# Patient Record
Sex: FEMALE | Employment: PART TIME | URBAN - METROPOLITAN AREA
[De-identification: names, ages, dates, MRNs, and addresses within clinical notes are randomized per-mention and may not be internally consistent; named-entity substitution may affect disease eponyms.]

---

## 2022-05-12 ENCOUNTER — OFFICE VISIT (OUTPATIENT)
Dept: FAMILY MEDICINE | Facility: CLINIC | Age: 22
End: 2022-05-12
Payer: COMMERCIAL

## 2022-05-12 VITALS
HEIGHT: 65 IN | RESPIRATION RATE: 18 BRPM | OXYGEN SATURATION: 99 % | DIASTOLIC BLOOD PRESSURE: 70 MMHG | SYSTOLIC BLOOD PRESSURE: 118 MMHG | WEIGHT: 125 LBS | HEART RATE: 70 BPM | BODY MASS INDEX: 20.83 KG/M2

## 2022-05-12 DIAGNOSIS — K21.9 GASTROESOPHAGEAL REFLUX DISEASE, UNSPECIFIED WHETHER ESOPHAGITIS PRESENT: Primary | ICD-10-CM

## 2022-05-12 DIAGNOSIS — Z12.4 SCREENING FOR MALIGNANT NEOPLASM OF CERVIX: ICD-10-CM

## 2022-05-12 DIAGNOSIS — R42 DIZZINESS: ICD-10-CM

## 2022-05-12 DIAGNOSIS — E55.9 VITAMIN D DEFICIENCY: ICD-10-CM

## 2022-05-12 DIAGNOSIS — E53.8 B12 DEFICIENCY: ICD-10-CM

## 2022-05-12 DIAGNOSIS — R53.83 FATIGUE, UNSPECIFIED TYPE: ICD-10-CM

## 2022-05-12 LAB
B-HCG UR QL: NEGATIVE
BILIRUB SERPL-MCNC: NEGATIVE MG/DL
BLOOD, POC UA: NEGATIVE
CTP QC/QA: YES
CTP QC/QA: YES
GLUCOSE UR QL STRIP: NEGATIVE
KETONES UR QL STRIP: NEGATIVE
LEUKOCYTE ESTERASE URINE, POC: NEGATIVE
NITRITE, POC UA: NEGATIVE
PH, POC UA: 8
PROTEIN, POC: NEGATIVE
SARS-COV-2 RDRP RESP QL NAA+PROBE: NEGATIVE
SPECIFIC GRAVITY, POC UA: 1.02
UROBILINOGEN, POC UA: 0.2

## 2022-05-12 PROCEDURE — 3008F BODY MASS INDEX DOCD: CPT | Mod: CPTII,S$GLB,, | Performed by: STUDENT IN AN ORGANIZED HEALTH CARE EDUCATION/TRAINING PROGRAM

## 2022-05-12 PROCEDURE — 81025 POCT URINE PREGNANCY: ICD-10-PCS | Mod: S$GLB,,, | Performed by: STUDENT IN AN ORGANIZED HEALTH CARE EDUCATION/TRAINING PROGRAM

## 2022-05-12 PROCEDURE — U0002: ICD-10-PCS | Mod: QW,S$GLB,, | Performed by: STUDENT IN AN ORGANIZED HEALTH CARE EDUCATION/TRAINING PROGRAM

## 2022-05-12 PROCEDURE — 3008F PR BODY MASS INDEX (BMI) DOCUMENTED: ICD-10-PCS | Mod: CPTII,S$GLB,, | Performed by: STUDENT IN AN ORGANIZED HEALTH CARE EDUCATION/TRAINING PROGRAM

## 2022-05-12 PROCEDURE — 1159F MED LIST DOCD IN RCRD: CPT | Mod: CPTII,S$GLB,, | Performed by: STUDENT IN AN ORGANIZED HEALTH CARE EDUCATION/TRAINING PROGRAM

## 2022-05-12 PROCEDURE — 99204 OFFICE O/P NEW MOD 45 MIN: CPT | Mod: S$GLB,,, | Performed by: STUDENT IN AN ORGANIZED HEALTH CARE EDUCATION/TRAINING PROGRAM

## 2022-05-12 PROCEDURE — 81003 POCT URINALYSIS: ICD-10-PCS | Mod: QW,S$GLB,, | Performed by: STUDENT IN AN ORGANIZED HEALTH CARE EDUCATION/TRAINING PROGRAM

## 2022-05-12 PROCEDURE — 1160F PR REVIEW ALL MEDS BY PRESCRIBER/CLIN PHARMACIST DOCUMENTED: ICD-10-PCS | Mod: CPTII,S$GLB,, | Performed by: STUDENT IN AN ORGANIZED HEALTH CARE EDUCATION/TRAINING PROGRAM

## 2022-05-12 PROCEDURE — 99204 PR OFFICE/OUTPT VISIT, NEW, LEVL IV, 45-59 MIN: ICD-10-PCS | Mod: S$GLB,,, | Performed by: STUDENT IN AN ORGANIZED HEALTH CARE EDUCATION/TRAINING PROGRAM

## 2022-05-12 PROCEDURE — 81025 URINE PREGNANCY TEST: CPT | Mod: S$GLB,,, | Performed by: STUDENT IN AN ORGANIZED HEALTH CARE EDUCATION/TRAINING PROGRAM

## 2022-05-12 PROCEDURE — 81003 URINALYSIS AUTO W/O SCOPE: CPT | Mod: QW,S$GLB,, | Performed by: STUDENT IN AN ORGANIZED HEALTH CARE EDUCATION/TRAINING PROGRAM

## 2022-05-12 PROCEDURE — 3074F SYST BP LT 130 MM HG: CPT | Mod: CPTII,S$GLB,, | Performed by: STUDENT IN AN ORGANIZED HEALTH CARE EDUCATION/TRAINING PROGRAM

## 2022-05-12 PROCEDURE — 3078F PR MOST RECENT DIASTOLIC BLOOD PRESSURE < 80 MM HG: ICD-10-PCS | Mod: CPTII,S$GLB,, | Performed by: STUDENT IN AN ORGANIZED HEALTH CARE EDUCATION/TRAINING PROGRAM

## 2022-05-12 PROCEDURE — 1159F PR MEDICATION LIST DOCUMENTED IN MEDICAL RECORD: ICD-10-PCS | Mod: CPTII,S$GLB,, | Performed by: STUDENT IN AN ORGANIZED HEALTH CARE EDUCATION/TRAINING PROGRAM

## 2022-05-12 PROCEDURE — 1160F RVW MEDS BY RX/DR IN RCRD: CPT | Mod: CPTII,S$GLB,, | Performed by: STUDENT IN AN ORGANIZED HEALTH CARE EDUCATION/TRAINING PROGRAM

## 2022-05-12 PROCEDURE — 3074F PR MOST RECENT SYSTOLIC BLOOD PRESSURE < 130 MM HG: ICD-10-PCS | Mod: CPTII,S$GLB,, | Performed by: STUDENT IN AN ORGANIZED HEALTH CARE EDUCATION/TRAINING PROGRAM

## 2022-05-12 PROCEDURE — 3078F DIAST BP <80 MM HG: CPT | Mod: CPTII,S$GLB,, | Performed by: STUDENT IN AN ORGANIZED HEALTH CARE EDUCATION/TRAINING PROGRAM

## 2022-05-12 PROCEDURE — U0002 COVID-19 LAB TEST NON-CDC: HCPCS | Mod: QW,S$GLB,, | Performed by: STUDENT IN AN ORGANIZED HEALTH CARE EDUCATION/TRAINING PROGRAM

## 2022-05-12 NOTE — PROGRESS NOTES
Subjective:      Patient ID: Kwame Guerra is a 22 y.o. female.    Chief Complaint: Fatigue, Dizziness, Anorexia, Shaking, Muscle Pain, and Insomnia      HPI:  22-year-old female with history of acute ITP presents today for fatigue dizziness, muscle pain, and insomnia.  Patient states symptoms began approximately 1 week ago.  Denies any cough or congestion.  Denies fever.  Patient reports being very drowsy throughout the day.  States her appetite is decreased.  She does report her legs feeling weak after the day.  She does have trouble sleeping.  She has had difficulty with anxiety in the past.  Does not take medication for this.  Denies any recent medication changes.  Denies nausea or vomiting.  She does have acid reflux.  Most noticeable after most foods.  She has not tried any OTC meds.  Otherwise patient doing well.  She is ready for blood work today.    Past Medical History:   Diagnosis Date    Acute ITP      Past Surgical History:   Procedure Laterality Date    CLOSED REDUCTION AND PERCUTANEOUS PINNING DISTAL RADIUS FRACTURE       Family History   Problem Relation Age of Onset    Hypertension Father      Social History     Socioeconomic History    Marital status: Single   Tobacco Use    Smoking status: Never Smoker    Smokeless tobacco: Never Used   Substance and Sexual Activity    Alcohol use: Never    Drug use: Never     Review of patient's allergies indicates:  No Known Allergies    Review of Systems   Constitutional: Positive for fatigue. Negative for activity change, appetite change, fever and unexpected weight change.   HENT: Negative for congestion, postnasal drip, rhinorrhea and sinus pain.    Respiratory: Negative for cough and shortness of breath.    Cardiovascular: Negative for chest pain and palpitations.   Gastrointestinal: Negative for abdominal pain, nausea and vomiting.   Genitourinary: Negative for difficulty urinating, dysuria, frequency and urgency.   Musculoskeletal: Negative for  "arthralgias and myalgias.   Neurological: Positive for dizziness. Negative for seizures, syncope, speech difficulty, numbness and headaches.   Psychiatric/Behavioral: Negative for decreased concentration and dysphoric mood. The patient is not nervous/anxious.        Objective:       /70 (BP Location: Left arm, Patient Position: Sitting, BP Method: Medium (Manual))   Pulse 70   Resp 18   Ht 5' 5" (1.651 m)   Wt 56.7 kg (125 lb)   SpO2 99%   BMI 20.80 kg/m²   Physical Exam  Vitals and nursing note reviewed.   Constitutional:       Appearance: Normal appearance. She is well-developed.   HENT:      Head: Normocephalic and atraumatic.   Eyes:      Extraocular Movements: Extraocular movements intact.      Conjunctiva/sclera: Conjunctivae normal.      Pupils: Pupils are equal, round, and reactive to light.   Cardiovascular:      Rate and Rhythm: Normal rate and regular rhythm.      Heart sounds: Normal heart sounds.   Pulmonary:      Effort: Pulmonary effort is normal.      Breath sounds: Normal breath sounds. No wheezing, rhonchi or rales.   Abdominal:      Palpations: Abdomen is soft.      Tenderness: There is no abdominal tenderness. There is no guarding or rebound.   Musculoskeletal:         General: Normal range of motion.      Cervical back: Normal range of motion and neck supple.   Skin:     General: Skin is warm and dry.   Neurological:      General: No focal deficit present.      Mental Status: She is alert and oriented to person, place, and time.   Psychiatric:         Mood and Affect: Mood normal.         Assessment:     1. Gastroesophageal reflux disease, unspecified whether esophagitis present    2. Dizziness    3. Fatigue, unspecified type    4. Vitamin D deficiency    5. B12 deficiency    6. Screening for malignant neoplasm of cervix        Plan:   Gastroesophageal reflux disease, unspecified whether esophagitis present    Dizziness  -     POCT urine pregnancy  -     POCT URINALYSIS  -     EKG " 12-lead  -     CBC Auto Differential; Future; Expected date: 05/12/2022  -     Comprehensive Metabolic Panel; Future; Expected date: 05/12/2022  -     Vitamin D; Future; Expected date: 05/12/2022  -     Vitamin B12; Future; Expected date: 05/12/2022    Fatigue, unspecified type  -     POCT COVID-19 Rapid Screening  -     EKG 12-lead  -     CBC Auto Differential; Future; Expected date: 05/12/2022  -     Comprehensive Metabolic Panel; Future; Expected date: 05/12/2022  -     Lipid Panel; Future; Expected date: 05/12/2022  -     T4, Free; Future; Expected date: 05/12/2022  -     TSH; Future; Expected date: 05/12/2022  -     Vitamin D; Future; Expected date: 05/12/2022  -     Vitamin B12; Future; Expected date: 05/12/2022    Vitamin D deficiency  -     Vitamin D; Future; Expected date: 05/12/2022    B12 deficiency  -     Vitamin B12; Future; Expected date: 05/12/2022    Screening for malignant neoplasm of cervix      Conservative measures discussed.  Cut back on tomato based foods, onions, garlic, caffeine, and chocolate.  Can use OTC meds as knee consider Protonix if no improvement.    UA and UPT negative.    EKG obtained and reviewed with the following findings:  Sinus arrhythmia.  Normal axis.  No acute ST or T-wave changes.    COVID negative.    Labs pending.    Increase fluid intake.    Discussed Pap smear screening.  Patient plans to find gynecologist back home.    Strict precautions provided.  RTC if symptoms worsen or no improvement.               Disclaimer: This note may have been prepared using voice recognition software, it may have not been extensively proofed, as such there could be errors within the text such as sound alike errors.

## 2022-05-14 LAB
ABS NRBC COUNT: 0 X 10 3/UL (ref 0–0.01)
ABSOLUTE BASOPHIL: 0.05 X 10 3/UL (ref 0–0.22)
ABSOLUTE EOSINOPHIL: 0.12 X 10 3/UL (ref 0.04–0.54)
ABSOLUTE IMMATURE GRAN: 0.02 X 10 3/UL (ref 0–0.04)
ABSOLUTE LYMPHOCYTE: 2.03 X 10 3/UL (ref 0.86–4.75)
ABSOLUTE MONOCYTE: 0.65 X 10 3/UL (ref 0.22–1.08)
ALBUMIN SERPL-MCNC: 4.7 G/DL (ref 3.5–5.2)
ALBUMIN/GLOB SERPL ELPH: 2.1 {RATIO} (ref 1–2.7)
ALP ISOS SERPL LEV INH-CCNC: 52 U/L (ref 35–105)
ALT (SGPT): 11 U/L (ref 0–33)
ANION GAP SERPL CALC-SCNC: 11 MMOL/L (ref 8–17)
AST SERPL-CCNC: 17 U/L (ref 0–32)
B12: 367 PG/ML (ref 232–1245)
BASOPHILS NFR BLD: 0.6 % (ref 0.2–1.2)
BILIRUBIN, TOTAL: 0.29 MG/DL (ref 0–1.2)
BUN/CREAT SERPL: 15.8 (ref 6–20)
CALCIUM SERPL-MCNC: 9.3 MG/DL (ref 8.6–10.2)
CARBON DIOXIDE, CO2: 22 MMOL/L (ref 22–29)
CHLORIDE: 105 MMOL/L (ref 98–107)
CHOLEST SERPL-MSCNC: 112 MG/DL (ref 100–200)
CREAT SERPL-MCNC: 0.55 MG/DL (ref 0.5–0.9)
EOSINOPHIL NFR BLD: 1.4 % (ref 0.7–7)
GFR ESTIMATION: 139.53
GLOBULIN: 2.2 G/DL (ref 1.5–4.5)
GLUCOSE: 81 MG/DL (ref 74–106)
HCT VFR BLD AUTO: 37.1 % (ref 37–47)
HDLC SERPL-MCNC: 77 MG/DL
HGB BLD-MCNC: 12.4 G/DL (ref 12–16)
IMMATURE GRANULOCYTES: 0.2 % (ref 0–0.5)
LDL/HDL RATIO: NORMAL
LDLC SERPL CALC-MCNC: NORMAL MG/DL
LYMPHOCYTES NFR BLD: 23.5 % (ref 19.3–53.1)
MCH RBC QN AUTO: 30.8 PG (ref 27–32)
MCHC RBC AUTO-ENTMCNC: 33.4 G/DL (ref 32–36)
MCV RBC AUTO: 92.3 FL (ref 82–100)
MONOCYTES NFR BLD: 7.5 % (ref 4.7–12.5)
NEUTROPHILS # BLD AUTO: 5.76 X 10 3/UL (ref 2.15–7.56)
NEUTROPHILS NFR BLD: 66.8 % (ref 34–71.1)
NUCLEATED RED BLOOD CELLS: 0 /100 WBC (ref 0–0.2)
PLATELET # BLD AUTO: 165 X 10 3/UL (ref 135–400)
POTASSIUM: 4 MMOL/L (ref 3.5–5.1)
PROT SNV-MCNC: 6.9 G/DL (ref 6.4–8.3)
RBC # BLD AUTO: 4.02 X 10 6/UL (ref 4.2–5.4)
RDW-SD: 43.7 FL (ref 37–54)
SODIUM: 138 MMOL/L (ref 136–145)
T4, FREE: 1.22 NG/DL (ref 0.93–1.7)
TRIGL SERPL-MCNC: 21 MG/DL (ref 0–150)
TSH SERPL DL<=0.005 MIU/L-ACNC: 1.08 UIU/ML (ref 0.27–4.2)
UREA NITROGEN (BUN): 8.7 MG/DL (ref 6–20)
VITAMIN D (25OHD): 10.1 NG/ML
WBC # BLD: 8.63 X 10 3/UL (ref 4.3–10.8)

## 2022-05-19 RX ORDER — ERGOCALCIFEROL 1.25 MG/1
50000 CAPSULE ORAL
Qty: 4 CAPSULE | Refills: 2 | Status: SHIPPED | OUTPATIENT
Start: 2022-05-19 | End: 2022-06-18

## 2024-08-13 ENCOUNTER — OFFICE VISIT (OUTPATIENT)
Dept: FAMILY MEDICINE | Facility: CLINIC | Age: 24
End: 2024-08-13
Payer: COMMERCIAL

## 2024-08-13 VITALS
SYSTOLIC BLOOD PRESSURE: 113 MMHG | BODY MASS INDEX: 21.16 KG/M2 | HEIGHT: 65 IN | HEART RATE: 82 BPM | OXYGEN SATURATION: 100 % | WEIGHT: 127 LBS | DIASTOLIC BLOOD PRESSURE: 71 MMHG

## 2024-08-13 DIAGNOSIS — E55.9 VITAMIN D DEFICIENCY: ICD-10-CM

## 2024-08-13 DIAGNOSIS — D50.9 IRON DEFICIENCY ANEMIA, UNSPECIFIED IRON DEFICIENCY ANEMIA TYPE: Primary | ICD-10-CM

## 2024-08-13 DIAGNOSIS — Z00.00 PREVENTATIVE HEALTH CARE: Primary | ICD-10-CM

## 2024-08-13 DIAGNOSIS — J30.9 ALLERGIC RHINITIS, UNSPECIFIED SEASONALITY, UNSPECIFIED TRIGGER: ICD-10-CM

## 2024-08-13 DIAGNOSIS — R00.0 TACHYCARDIA: ICD-10-CM

## 2024-08-13 LAB
%TRANSFERRIN SATURATION: 11.1 % (ref 20–50)
ABS NRBC COUNT: 0 X 10 3/UL (ref 0–0.01)
ABSOLUTE BASOPHIL: 0.04 X 10 3/UL (ref 0–0.22)
ABSOLUTE EOSINOPHIL: 0.32 X 10 3/UL (ref 0.04–0.54)
ABSOLUTE IMMATURE GRAN: 0.01 X 10 3/UL (ref 0–0.04)
ABSOLUTE LYMPHOCYTE: 2.15 X 10 3/UL (ref 0.86–4.75)
ABSOLUTE MONOCYTE: 0.59 X 10 3/UL (ref 0.22–1.08)
ALBUMIN SERPL-MCNC: 4.4 G/DL (ref 3.5–5.2)
ALBUMIN/GLOB SERPL ELPH: 1.7 {RATIO} (ref 1–2.7)
ALP ISOS SERPL LEV INH-CCNC: 43 U/L (ref 35–105)
ALT (SGPT): 9 U/L (ref 0–33)
AMORPH URATE CRY URNS QL MICRO: NEGATIVE
ANION GAP SERPL CALC-SCNC: 11 MMOL/L (ref 8–17)
AST SERPL-CCNC: 15 U/L (ref 0–32)
BACTERIA #/AREA URNS HPF: ABNORMAL /[HPF]
BASOPHILS NFR BLD: 0.6 % (ref 0.2–1.2)
BILIRUB UR QL STRIP: NEGATIVE
BILIRUBIN, TOTAL: 0.18 MG/DL (ref 0–1.2)
BUN/CREAT SERPL: 13.4 (ref 6–20)
CALCIUM SERPL-MCNC: 9.3 MG/DL (ref 8.6–10.2)
CARBON DIOXIDE, CO2: 23 MMOL/L (ref 22–29)
CHLORIDE: 108 MMOL/L (ref 98–107)
CHOLEST SERPL-MSCNC: 115 MG/DL (ref 100–200)
CLARITY UR: ABNORMAL
COLOR UR: YELLOW
CREAT SERPL-MCNC: 0.65 MG/DL (ref 0.5–0.9)
EOSINOPHIL NFR BLD: 5 % (ref 0.7–7)
EPITHELIAL CELLS: ABNORMAL
ESTIMATED AVERAGE GLUCOSE: 98 MG/DL
FERRITIN: 55.6 NG/ML (ref 10–154)
GFR ESTIMATION: 126.8 ML/MIN/1.73M2
GLOBULIN: 2.6 G/DL (ref 1.5–4.5)
GLUCOSE (UA): NEGATIVE MG/DL
GLUCOSE: 84 MG/DL (ref 74–106)
HBA1C MFR BLD: 5.1 % (ref 4–6)
HCT VFR BLD AUTO: 34.3 % (ref 37–47)
HDLC SERPL-MCNC: 71 MG/DL
HGB BLD-MCNC: 11.3 G/DL (ref 12–16)
IMMATURE GRANULOCYTES: 0.2 % (ref 0–0.5)
IRON BINDING CAPACITY: 279 UG/DL (ref 262–472)
IRON SERPL-MCNC: 31 UG/DL (ref 37–145)
KETONES UR QL STRIP: NEGATIVE MG/DL
LDL/HDL RATIO: NORMAL
LDLC SERPL CALC-MCNC: NORMAL MG/DL
LEUKOCYTE ESTERASE UR QL STRIP: NEGATIVE
LYMPHOCYTES NFR BLD: 33.8 % (ref 19.3–53.1)
MAGNESIUM SERPL-MCNC: 2 MG/DL (ref 1.6–2.4)
MCH RBC QN AUTO: 30.1 PG (ref 27–32)
MCHC RBC AUTO-ENTMCNC: 32.9 G/DL (ref 32–36)
MCV RBC AUTO: 91.5 FL (ref 82–100)
MONOCYTES NFR BLD: 9.3 % (ref 4.7–12.5)
MUCOUS THREADS URNS QL MICRO: ABNORMAL
NEUTROPHILS # BLD AUTO: 3.26 X 10 3/UL (ref 2.15–7.56)
NEUTROPHILS NFR BLD: 51.1 % (ref 34–71.1)
NITRITE UR QL STRIP: NEGATIVE
NUCLEATED RED BLOOD CELLS: 0 /100 WBC (ref 0–0.2)
OCCULT BLOOD: ABNORMAL
PH, URINE: 6 (ref 5–7.5)
PLATELET # BLD AUTO: 147 X 10 3/UL (ref 135–400)
POTASSIUM: 3.9 MMOL/L (ref 3.5–5.1)
PROT SNV-MCNC: 7 G/DL (ref 6.4–8.3)
PROT UR QL STRIP: NEGATIVE MG/DL
RBC # BLD AUTO: 3.75 X 10 6/UL (ref 4.2–5.4)
RBC/HPF: NEGATIVE
RDW-SD: 42.7 FL (ref 37–54)
SODIUM: 142 MMOL/L (ref 136–145)
SP GR UR STRIP: 1.01 (ref 1–1.03)
T4, FREE: 1.18 NG/DL (ref 0.93–1.7)
TRIGL SERPL-MCNC: 23 MG/DL (ref 0–150)
TSH SERPL DL<=0.005 MIU/L-ACNC: 3.42 UIU/ML (ref 0.27–4.2)
UIBC SERPL-MCNC: 248 UG/DL (ref 112–306)
UREA NITROGEN (BUN): 8.7 MG/DL (ref 6–20)
UROBILINOGEN, URINE: NORMAL E.U./DL (ref 0–1)
VITAMIN D (25OHD): 31.2 NG/ML
WBC # BLD: 6.37 X 10 3/UL (ref 4.3–10.8)
WBC/HPF: ABNORMAL

## 2024-08-13 PROCEDURE — 3008F BODY MASS INDEX DOCD: CPT | Mod: CPTII,S$GLB,, | Performed by: NURSE PRACTITIONER

## 2024-08-13 PROCEDURE — 99385 PREV VISIT NEW AGE 18-39: CPT | Mod: S$GLB,,, | Performed by: NURSE PRACTITIONER

## 2024-08-13 PROCEDURE — 3074F SYST BP LT 130 MM HG: CPT | Mod: CPTII,S$GLB,, | Performed by: NURSE PRACTITIONER

## 2024-08-13 PROCEDURE — 3078F DIAST BP <80 MM HG: CPT | Mod: CPTII,S$GLB,, | Performed by: NURSE PRACTITIONER

## 2024-08-13 PROCEDURE — 1159F MED LIST DOCD IN RCRD: CPT | Mod: CPTII,S$GLB,, | Performed by: NURSE PRACTITIONER

## 2024-08-13 RX ORDER — FERROUS GLUCONATE 324(38)MG
324 TABLET ORAL
Qty: 90 TABLET | Refills: 0 | Status: SHIPPED | OUTPATIENT
Start: 2024-08-13

## 2024-08-13 RX ORDER — MONTELUKAST SODIUM 10 MG/1
10 TABLET ORAL NIGHTLY
Qty: 30 TABLET | Refills: 3 | Status: SHIPPED | OUTPATIENT
Start: 2024-08-13 | End: 2024-09-12

## 2024-08-13 NOTE — PROGRESS NOTES
Subjective:      Patient ID: Kwame Guerra is a 24 y.o. female.    Chief Complaint: Establish Care (Did polograph test for job at [employer] office ; showed tachycardia concerns) and allergy screening (Congestion x1mn/runny nose)      24-year-old female     with history of acute ITP age 8... resolved.       Here today for annual wellness visit and to establish primary care.         Patient reports an episode of tachycardia while getting a poly graft done.  The technician told her that she had spikes of tachycardia.  She denies chest pain, palpitations, shortness for breath, KEITH.  She would like to get an EKG done.       2nd issue is seasonal allergies.  Uncontrolled despite use of Zyrtec daily.  She is requesting allergy testing.      No other issues reported      Past Medical History:   Diagnosis Date    Acute ITP       Social History     Socioeconomic History    Marital status: Single   Tobacco Use    Smoking status: Never    Smokeless tobacco: Never   Substance and Sexual Activity    Alcohol use: Never    Drug use: Never     Social Determinants of Health     Financial Resource Strain: Medium Risk (8/10/2024)    Overall Financial Resource Strain (CARDIA)     Difficulty of Paying Living Expenses: Somewhat hard   Food Insecurity: No Food Insecurity (8/10/2024)    Hunger Vital Sign     Worried About Running Out of Food in the Last Year: Never true     Ran Out of Food in the Last Year: Never true   Physical Activity: Sufficiently Active (8/10/2024)    Exercise Vital Sign     Days of Exercise per Week: 4 days     Minutes of Exercise per Session: 40 min   Stress: No Stress Concern Present (8/10/2024)    [employer] - Occupational Stress Questionnaire     Feeling of Stress : Not at all   Housing Stability: Unknown (8/10/2024)    Housing Stability Vital Sign     Unable to Pay for Housing in the Last Year: No      Family History   Problem Relation Name Age of Onset    Hypertension Father           ROS:   Review of Systems   Constitutional:  Negative for chills and fever.   HENT:  Positive for rhinorrhea. Negative for sneezing, sore throat and trouble swallowing.    Respiratory:  Negative for apnea, cough, choking, shortness of breath and wheezing.    Cardiovascular:  Negative for chest pain, palpitations and leg swelling.   Gastrointestinal:  Positive for diarrhea (occasional). Negative for abdominal pain, nausea and vomiting.   Genitourinary:  Negative for difficulty urinating, dysuria, flank pain, frequency, hematuria and urgency.   Musculoskeletal:  Negative for joint swelling and neck stiffness.   Skin:  Negative for rash.   Neurological:  Negative for dizziness and weakness.   Psychiatric/Behavioral:  Negative for confusion, hallucinations, self-injury and suicidal ideas.    All other systems reviewed and are negative.    Objective:   Physical Exam  Vitals and nursing note reviewed.   Constitutional:       General: She is not in acute distress.     Appearance: Normal appearance. She is well-developed. She is not ill-appearing.   HENT:      Head: Normocephalic and atraumatic.      Right Ear: Tympanic membrane, ear canal and external ear normal.      Left Ear: Tympanic membrane, ear canal and external ear normal.      Nose: Nose normal.      Mouth/Throat:      Mouth: Mucous membranes are moist.      Pharynx: Oropharynx is clear. No oropharyngeal exudate or posterior oropharyngeal erythema.   Eyes:      Extraocular Movements: Extraocular movements intact.      Conjunctiva/sclera: Conjunctivae normal.      Pupils: Pupils are equal, round, and reactive to light.   Neck:      Thyroid: No thyroid mass, thyromegaly or thyroid tenderness.      Vascular: No carotid bruit.   Cardiovascular:      Rate and Rhythm: Normal rate and regular rhythm.      Pulses: Normal pulses.      Heart sounds: Normal heart sounds. No murmur heard.     No friction rub. No gallop.   Pulmonary:      Effort: Pulmonary effort is normal.       Breath sounds: Normal breath sounds. No wheezing, rhonchi or rales.   Abdominal:      Palpations: Abdomen is soft.      Tenderness: There is no abdominal tenderness. There is no guarding or rebound.   Musculoskeletal:         General: Normal range of motion.      Cervical back: Normal range of motion and neck supple.   Skin:     General: Skin is warm and dry.   Neurological:      Mental Status: She is alert and oriented to person, place, and time. Mental status is at baseline.   Psychiatric:         Mood and Affect: Mood normal.         Behavior: Behavior normal.         Thought Content: Thought content normal.       Assessment:     1. Preventative health care    2. Vitamin D deficiency    3. Tachycardia    4. Allergic rhinitis, unspecified seasonality, unspecified trigger      No images are attached to the encounter.   Plan:     Problem List Items Addressed This Visit    None  Visit Diagnoses       Preventative health care    -  Primary    Relevant Orders    Magnesium    Iron, TIBC and Ferritin Panel    Vitamin D    Urinalysis, Reflex to Urine Culture Urine, Clean Catch    TSH+Free T4    Lipid Panel    Hemoglobin A1C    Comprehensive Metabolic Panel    CBC Auto Differential    Vitamin D deficiency        Relevant Orders    Magnesium    Iron, TIBC and Ferritin Panel    Vitamin D    Urinalysis, Reflex to Urine Culture Urine, Clean Catch    TSH+Free T4    Lipid Panel    Hemoglobin A1C    Comprehensive Metabolic Panel    CBC Auto Differential    Tachycardia        Advised to notify me if palpitations present. would order holter at that time.    Relevant Orders    Magnesium    Iron, TIBC and Ferritin Panel    Vitamin D    Urinalysis, Reflex to Urine Culture Urine, Clean Catch    TSH+Free T4    Lipid Panel    Hemoglobin A1C    Comprehensive Metabolic Panel    CBC Auto Differential    EKG 12-lead    Allergic rhinitis, unspecified seasonality, unspecified trigger        Start singulair. Continue Zyrtec. Refer to ENT  for allergy testing.    Relevant Medications    montelukast (SINGULAIR) 10 mg tablet    Other Relevant Orders    Ambulatory referral/consult to Allergy          Procedures     No visits with results within 1 Month(s) from this visit.   Latest known visit with results is:   Office Visit on 05/12/2022   Component Date Value Ref Range Status    POC Preg Test, Ur 05/12/2022 Negative  Negative Final     Acceptable 05/12/2022 Yes   Final    WBC, UA 05/12/2022 Negative   Final    Nitrite, UA 05/12/2022 Negative   Final    Urobilinogen, UA 05/12/2022 0.2   Final    Protein, POC 05/12/2022 Negative   Final    pH, UA 05/12/2022 8.0   Final    Blood, UA 05/12/2022 Negative   Final    Spec Grav UA 05/12/2022 1.020   Final    Ketones, UA 05/12/2022 Negative   Final    Bilirubin, POC 05/12/2022 Negative   Final    Glucose, UA 05/12/2022 Negative   Final    POC Rapid COVID 05/12/2022 Negative  Negative Final     Acceptable 05/12/2022 Yes   Final    B12 05/13/2022 367  232 - 1,245 pg/mL Final    Comment: NOTE  Testing performed at:  [facility], [address] [place] [address] CLIA #:[id number]      VITAMIN D (25OHD) 05/13/2022 10.1 (L)  >30 ng/mL Final    Comment: Expected values:    Deficient                 <20 ng/mL  Insufficient              21-29 ng/mL  Sufficient                >30 ng/mL  NOTE  Testing performed at:  [facility], [address] [place] [address] CLIA #:[id number]      TSH 05/13/2022 1.08  0.27 - 4.20 uIU/mL Final    Comment: NOTE  Testing performed at:  [facility], [address] [place] [address] CLIA #:[id number]      T4, Free 05/13/2022 1.22  0.93 - 1.70 ng/dL Final    Comment: NOTE  Testing performed at:  [facility], [address] [place] [address] CLIA #:[id number]      Glucose 05/13/2022 81  74 - 106 mg/dL Final    BUN 05/13/2022 8.7  6 - 20 mg/dL Final    Creatinine 05/13/2022 0.55  0.50 -  0.90 mg/dL Final    AST 05/13/2022 17  0 - 32 U/L Final    ALT (SGPT) 05/13/2022 11  0 - 33 U/L Final    Alkaline Phosphatase 05/13/2022 52  35 - 105 U/L Final    Calcium 05/13/2022 9.3  8.6 - 10.2 mg/dL Final    Protein, Total 05/13/2022 6.9  6.4 - 8.3 g/dL Final    Albumin 05/13/2022 4.7  3.5 - 5.2 g/dL Final    BILIRUBIN, TOTAL 05/13/2022 0.29  0.00 - 1.20 mg/dL Final    Sodium 05/13/2022 138  136 - 145 mmol/L Final    Potassium 05/13/2022 4.0  3.5 - 5.1 mmol/L Final    Chloride 05/13/2022 105  98 - 107 mmol/L Final    CO2 05/13/2022 22  22 - 29 mmol/L Final    Globulin 05/13/2022 2.2  1.5 - 4.5 g/dL Final    Albumin/Globulin Ratio 05/13/2022 2.1  1.0 - 2.7 Final    BUN/Creatinine Ratio 05/13/2022 15.8  6 - 20 Final    GFR ESTIMATION 05/13/2022 139.53  >60.00 Final    Comment: GFR estimation is reported as mL/min/1.73m squared.  It is recommended  that GFR values for  be multiplied x 1.212.  The  higher the GFR, the better the kidney function.  A GFR of >60 is  considered normal (depending on age and whether the patient is a male  or female.      Anion Gap 05/13/2022 11.0  8.0 - 17.0 mmol/L Final    Comment: NOTE  Testing performed at:  [facility], [address] [place] [address]  [address] CLIA #:[id number]      WBC 05/13/2022 8.63  4.3 - 10.8 X 10 3/ul Final    RBC 05/13/2022 4.02 (L)  4.2 - 5.4 X 10 6/ul Final    RDW-SD 05/13/2022 43.7  37 - 54 fl Final    Hemoglobin 05/13/2022 12.4  12 - 16 g/dL Final    Hematocrit 05/13/2022 37.1  37 - 47 % Final    MCV 05/13/2022 92.3  82 - 100 fl Final    MCH 05/13/2022 30.8  27 - 32 pg Final    MCHC 05/13/2022 33.4  32 - 36 g/dL Final    Platelets 05/13/2022 165  135 - 400 X 10 3/ul Final    Neutrophils 05/13/2022 66.8  34 - 71.1 % Final    Lymphocytes 05/13/2022 23.5  19.3 - 53.1 % Final    Monocytes 05/13/2022 7.5  4.7 - 12.5 % Final    Eosinophils 05/13/2022 1.4  0.7 - 7.0 % Final    Basophils 05/13/2022 0.6  0.2 - 1.2 % Final    Neutrophils  Absolute 05/13/2022 5.76  2.15 - 7.56 X 10 3/ul Final    Lymphocytes Absolute 05/13/2022 2.03  0.86 - 4.75 X 10 3/ul Final    Monocytes Absolute 05/13/2022 0.65  0.22 - 1.08 X 10 3/ul Final    Eosinophils Absolute 05/13/2022 0.12  0.04 - 0.54 X 10 3/ul Final    Basophils Absolute 05/13/2022 0.05  0.00 - 0.22 X 10 3/ul Final    Immature Granulocytes Absolute 05/13/2022 0.02  0 - 0.04 X 10 3/ul Final    Immature Granulocytes 05/13/2022 0.2  0 - 0.5 % Final    IG includes metamyelocytes, myelocytes, and promyelocytes    nRBC# 05/13/2022 0.0  0 - 0.2 /100 WBC Final    nRBC Count Absolute 05/13/2022 0.000  0 - 0.012 x 10 3/ul Final    Comment: NOTE  Testing performed at:  [facility], [address] [place] [address]  [id number] CLIA #:[id number]      Cholesterol 05/13/2022 112  100 - 200 mg/dL Final    Comment: Desirable  <200  Borderline 200-240  High risk  >240      Triglycerides 05/13/2022 21  0 - 150 mg/dL Final    HDL 05/13/2022 77  >60 mg/dL Final    Comment: <40 mg/dL Major risk factor for CHD  >60 mg/dL Negative risk factor for CHD      LDL Cholesterol 05/13/2022 Test Not Performed   Final    Comment: Unable to accurately calculate LDL due to abnormal Triglycerides.  Please contact lab if Direct LDL needed.      LDL/HDL Ratio 05/13/2022 Test Not Performed   Final    Comment: NOTE  Testing performed at:  [facility], [address] [place] [address]  [address] CLIA #:[id number]          No results found in the last 30 days.       Duration of encounter:  minutes  This includes face-to-face time and non face-to-face time preparing to see the patient (eg, review of tests), obtaining and/or reviewing separately obtained history, documenting clinical information in the electronic or other health record, independently interpreting resultsand communicating results to the patient/family/caregiver, or care coordination      DISCLAIMER: This note was prepared with MModal voice recognition transcription  software. Garbled syntax, mangled pronouns, and other bizarre constructions may be attributed to that software system.     All diagnostic data (labs/imaging) was reviewed with the patient and/or family member in the room.  All questions were answered to their liking. The patient and/or family member voiced understanding of all instructions provided. Expectations regarding follow up and treatment plan were voiced and confirmed prior to departure. The patient was given orders/instructions at the end of the visit for reference. They were instructed to notify my office if they have not been contacted for imaging/referrals/labs/results in 1-2 weeks. They voiced understanding of all of the above.     Follow up:     There are no Patient Instructions on file for this visit.     Follow up in about 2 weeks (around 8/27/2024), or if symptoms worsen or fail to improve.

## 2024-08-16 ENCOUNTER — TELEPHONE (OUTPATIENT)
Dept: FAMILY MEDICINE | Facility: CLINIC | Age: 24
End: 2024-08-16
Payer: COMMERCIAL

## 2024-08-16 NOTE — TELEPHONE ENCOUNTER
----- Message from Harvey Benavides NP sent at 8/13/2024  1:35 PM CDT -----  Can let her know she has iron deficiency anemia... I called out iron tablets. Will discuss the rest of her labs at follow up.

## 2024-08-26 PROBLEM — D50.9 IRON DEFICIENCY ANEMIA: Status: ACTIVE | Noted: 2024-08-26

## 2024-08-26 NOTE — PROGRESS NOTES
Subjective:      Patient ID: Kwame Guerra is a 24 y.o. female.    Chief Complaint: Follow-up and Results      24-year-old female     with history of acute ITP age 8... resolved.       Here today for lab discussion. Noted to have SEJAL. Started ferrous gluconate. Energy levels improved.       2nd issue is seasonal allergies.  Better with Singulair and Zyrtec. Has ENT upcoming.       No other issues reported      Past Medical History:   Diagnosis Date    Acute ITP       Social History     Socioeconomic History    Marital status: Single   Tobacco Use    Smoking status: Never    Smokeless tobacco: Never   Substance and Sexual Activity    Alcohol use: Never    Drug use: Never     Social Determinants of Health     Financial Resource Strain: Medium Risk (8/10/2024)    Overall Financial Resource Strain (CARDIA)     Difficulty of Paying Living Expenses: Somewhat hard   Food Insecurity: No Food Insecurity (8/10/2024)    Hunger Vital Sign     Worried About Running Out of Food in the Last Year: Never true     Ran Out of Food in the Last Year: Never true   Physical Activity: Sufficiently Active (8/10/2024)    Exercise Vital Sign     Days of Exercise per Week: 4 days     Minutes of Exercise per Session: 40 min   Stress: No Stress Concern Present (8/10/2024)    [employer] [employer] - Occupational Stress Questionnaire     Feeling of Stress : Not at all   Housing Stability: Unknown (8/10/2024)    Housing Stability Vital Sign     Unable to Pay for Housing in the Last Year: No      Family History   Problem Relation Name Age of Onset    Hypertension Father          ROS:   Review of Systems   Constitutional:  Negative for chills and fever.   HENT:  Negative for rhinorrhea, sneezing, sore throat and trouble swallowing.    Respiratory:  Negative for apnea, cough, choking, shortness of breath and wheezing.    Cardiovascular:  Negative for chest pain, palpitations and leg swelling.   Gastrointestinal:  Positive for diarrhea  (occasional). Negative for abdominal pain, nausea and vomiting.   Genitourinary:  Negative for difficulty urinating, dysuria, flank pain, frequency, hematuria and urgency.   Musculoskeletal:  Negative for joint swelling and neck stiffness.   Skin:  Negative for rash.   Neurological:  Negative for dizziness and weakness.   Psychiatric/Behavioral:  Negative for confusion, hallucinations, self-injury and suicidal ideas.    All other systems reviewed and are negative.    Objective:   Physical Exam  Vitals and nursing note reviewed.   Constitutional:       General: She is not in acute distress.     Appearance: Normal appearance. She is not ill-appearing.   HENT:      Head: Normocephalic.      Nose: Nose normal. No congestion or rhinorrhea.   Eyes:      Pupils: Pupils are equal, round, and reactive to light.   Cardiovascular:      Rate and Rhythm: Normal rate.   Pulmonary:      Effort: Pulmonary effort is normal.   Musculoskeletal:         General: Normal range of motion.      Cervical back: Normal range of motion.   Skin:     General: Skin is warm.   Neurological:      Mental Status: She is alert and oriented to person, place, and time. Mental status is at baseline.   Psychiatric:         Attention and Perception: Attention and perception normal.         Mood and Affect: Mood and affect normal.         Speech: Speech normal.         Behavior: Behavior normal. Behavior is cooperative.         Thought Content: Thought content normal.         Cognition and Memory: Cognition and memory normal.         Judgment: Judgment normal.       Assessment:     1. Iron deficiency anemia, unspecified iron deficiency anemia type    2. Encounter to discuss test results    3. Vitamin D deficiency    4. Allergic rhinitis, unspecified seasonality, unspecified trigger      No images are attached to the encounter.   Plan:     Problem List Items Addressed This Visit          ENT    Allergic rhinitis    Current Assessment & Plan     Improved w/  singulair and OTC allergy med. She has an upcoming ENT apt.             Oncology    Iron deficiency anemia - Primary    Current Assessment & Plan     Currently on Ferrous gluconate. Recommend repeat CBC and iron panel in 6 weeks.          Relevant Orders    CBC Auto Differential    Iron, TIBC and Ferritin Panel     Other Visit Diagnoses       Encounter to discuss test results        Vitamin D deficiency        resolved.          Procedures     Office Visit on 08/13/2024   Component Date Value Ref Range Status    Magnesium 08/13/2024 2.00  1.60 - 2.40 mg/dL Final    Comment: NOTE  Testing performed at:  [facility], [address] [place] [address]  [address] CLIA #:[id number]      VITAMIN D (25OHD) 08/13/2024 31.2  >30 ng/mL Final    Comment: Expected values:    Deficient                 <20 ng/mL  Insufficient              21-29 ng/mL  Sufficient                >30 ng/mL  NOTE  Testing performed at:  [facility], [address] [place] [address]  [address] CLIA #:[id number]      Color, UA 08/13/2024 YELLOW   Final    Clarity, UA 08/13/2024 HAZY   Final    Specific Gravity,UA 08/13/2024 1.015  1.005 - 1.030 Final    pH, Urine 08/13/2024 6  5 - 7.5 Final    Leukocytes, UA 08/13/2024 NEGATIVE  NEGATIVE Final    Nitrite, Urine 08/13/2024 NEGATIVE  NEGATIVE Final    Protein, UA 08/13/2024 NEGATIVE  NEGATIVE mg/dL Final    Glucose, UA 08/13/2024 NEGATIVE  NEGATIVE mg/dL Final    Ketones, UA 08/13/2024 NEGATIVE  NEGATIVE mg/dL Final    Urobilinogen, urine 08/13/2024 NORMAL  0 - 1.0 E.U./dL Final    Bilirubin (UA) 08/13/2024 NEGATIVE  NEGATIVE Final    Occult Blood 08/13/2024 MODERATE (A)  NEGATIVE Final    WBC/HPF 08/13/2024 RARE  <5 Final    RBC/HPF 08/13/2024 NEGATIVE  <5 Final    Amorphous, UA 08/13/2024 NEGATIVE   Final    Bacteria, UA 08/13/2024 2+ (A)  NEG-TRACE Final    Epithelial Cells 08/13/2024 MODERATE (A)  NEGATIVE-FEW Final    Mucus, UA 08/13/2024 2+ (A)  NEGATIVE Final    Comment:  NOTE  Testing performed at:  [facility], [address] [place] [address]  [address] CLIA #:[id number]      Cholesterol 08/13/2024 115  100 - 200 mg/dL Final    Comment: Desirable  <200  Borderline 200-240  High risk  >240      Triglycerides 08/13/2024 23  0 - 150 mg/dL Final    HDL 08/13/2024 71  >60 mg/dL Final    Comment: <40 mg/dL Major risk factor for CHD  >60 mg/dL Negative risk factor for CHD      LDL Cholesterol 08/13/2024 Test Not Performed   Final    Comment: Unable to accurately calculate LDL due to Triglycerides <40 or >400.  Please contact lab if Direct LDL needed.      LDL/HDL Ratio 08/13/2024 Test Not Performed   Final    Comment: NOTE  Testing performed at:  [facility], [address] [place] [address]  [address] CLIA #:[id number]      Hemoglobin A1C 08/13/2024 5.1  4.0 - 6.0 % Final    EST AVERAGE GLUCOSE 08/13/2024 98  NORMAL MG/DL Final    Comment: NOTE  Testing performed at:  [facility], [address] [place] [address]  [address] CLIA #:[id number]      Glucose 08/13/2024 84  74 - 106 mg/dL Final    BUN 08/13/2024 8.7  6 - 20 mg/dL Final    Creatinine 08/13/2024 0.65  0.50 - 0.90 mg/dL Final    AST 08/13/2024 15  0 - 32 U/L Final    ALT (SGPT) 08/13/2024 9  0 - 33 U/L Final    Alkaline Phosphatase 08/13/2024 43  35 - 105 U/L Final    Calcium 08/13/2024 9.3  8.6 - 10.2 mg/dL Final    Protein, Total 08/13/2024 7.0  6.4 - 8.3 g/dL Final    Albumin 08/13/2024 4.4  3.5 - 5.2 g/dL Final    BILIRUBIN, TOTAL 08/13/2024 0.18  0.00 - 1.20 mg/dL Final    Sodium 08/13/2024 142  136 - 145 mmol/L Final    Potassium 08/13/2024 3.9  3.5 - 5.1 mmol/L Final    Chloride 08/13/2024 108 (H)  98 - 107 mmol/L Final    CO2 08/13/2024 23  22 - 29 mmol/L Final    Globulin 08/13/2024 2.6  1.5 - 4.5 g/dL Final    Albumin/Globulin Ratio 08/13/2024 1.7  1.0 - 2.7 Final    BUN/Creatinine Ratio 08/13/2024 13.4  6 - 20 Final    GFR ESTIMATION 08/13/2024 126.80  >60.00 mL/min/1.73m2 Final    Anion Gap  08/13/2024 11.0  8.0 - 17.0 mmol/L Final    Comment: NOTE  Testing performed at:  [facility], [address] [place] [address]  [address] CLIA #:[id number]      WBC 08/13/2024 6.37  4.3 - 10.8 X 10 3/ul Final    RBC 08/13/2024 3.75 (L)  4.2 - 5.4 X 10 6/ul Final    RDW-SD 08/13/2024 42.7  37 - 54 fl Final    Hemoglobin 08/13/2024 11.3 (L)  12 - 16 g/dL Final    Hematocrit 08/13/2024 34.3 (L)  37 - 47 % Final    MCV 08/13/2024 91.5  82 - 100 fl Final    MCH 08/13/2024 30.1  27 - 32 pg Final    MCHC 08/13/2024 32.9  32 - 36 g/dL Final    Platelets 08/13/2024 147  135 - 400 X 10 3/ul Final    Neutrophils 08/13/2024 51.1  34 - 71.1 % Final    Lymphocytes 08/13/2024 33.8  19.3 - 53.1 % Final    Monocytes 08/13/2024 9.3  4.7 - 12.5 % Final    Eosinophils 08/13/2024 5.0  0.7 - 7.0 % Final    Basophils 08/13/2024 0.6  0.2 - 1.2 % Final    Neutrophils Absolute 08/13/2024 3.26  2.15 - 7.56 X 10 3/ul Final    Lymphocytes Absolute 08/13/2024 2.15  0.86 - 4.75 X 10 3/ul Final    Monocytes Absolute 08/13/2024 0.59  0.22 - 1.08 X 10 3/ul Final    Eosinophils Absolute 08/13/2024 0.32  0.04 - 0.54 X 10 3/ul Final    Basophils Absolute 08/13/2024 0.04  0.00 - 0.22 X 10 3/ul Final    Immature Granulocytes Absolute 08/13/2024 0.01  0 - 0.04 X 10 3/ul Final    Immature Granulocytes 08/13/2024 0.2  0 - 0.5 % Final    IG includes metamyelocytes, myelocytes, and promyelocytes    nRBC# 08/13/2024 0.0  0 - 0.2 /100 WBC Final    nRBC Count Absolute 08/13/2024 0.000  0 - 0.012 x 10 3/ul Final    Comment: NOTE  Testing performed at:  [facility], [address] [place] [address]  [address] CLIA #:[id number]      Iron 08/13/2024 31 (L)  37 - 145 ug/dL Final    Iron Binding Capacity 08/13/2024 279  262 - 472 ug/dL Final    UIBC 08/13/2024 248  112 - 306 ug/dL Final    %TRANSFERRIN SATURATION 08/13/2024 11.1 (L)  20 - 50 % Final    Comment: NOTE  Testing performed at:  [facility], [address] [place] [address]   [address] CLIA #:[id number]      Ferritin 08/13/2024 55.6  10 - 154 ng/mL Final    Comment: NOTE  Testing performed at:  [facility], [address] [facility], [place]  [address] CLIA #:[id number]      TSH 08/13/2024 3.42  0.27 - 4.20 uIU/mL Final    Comment: NOTE  Testing performed at:  [facility], [facility] [place]  [address] CLIA #:[id number]      T4, Free 08/13/2024 1.18  0.93 - 1.70 ng/dL Final    Comment: NOTE  Testing performed at:  [facility], [facility], [place]  [address] CLIA #:[id number]          No results found in the last 30 days.       Duration of encounter:  minutes  This includes face-to-face time and non face-to-face time preparing to see the patient (eg, review of tests), obtaining and/or reviewing separately obtained history, documenting clinical information in the electronic or other health record, independently interpreting resultsand communicating results to the patient/family/caregiver, or care coordination      DISCLAIMER: This note was prepared with [employer] voice recognition transcription software. Garbled syntax, mangled pronouns, and other bizarre constructions may be attributed to that software system.     All diagnostic data (labs/imaging) was reviewed with the patient and/or family member in the room.  All questions were answered to their liking. The patient and/or family member voiced understanding of all instructions provided. Expectations regarding follow up and treatment plan were voiced and confirmed prior to departure. The patient was given orders/instructions at the end of the visit for reference. They were instructed to notify my office if they have not been contacted for imaging/referrals/labs/results in 1-2 weeks. They voiced understanding of all of the above.     Follow up:     There are no Patient Instructions on file for this visit.     Follow up in about 1 year (around 8/27/2025), or if symptoms worsen or fail to improve.

## 2024-08-27 ENCOUNTER — OFFICE VISIT (OUTPATIENT)
Dept: FAMILY MEDICINE | Facility: CLINIC | Age: 24
End: 2024-08-27
Payer: COMMERCIAL

## 2024-08-27 VITALS — DIASTOLIC BLOOD PRESSURE: 64 MMHG | SYSTOLIC BLOOD PRESSURE: 100 MMHG | HEART RATE: 88 BPM | OXYGEN SATURATION: 98 %

## 2024-08-27 DIAGNOSIS — D50.9 IRON DEFICIENCY ANEMIA, UNSPECIFIED IRON DEFICIENCY ANEMIA TYPE: Primary | ICD-10-CM

## 2024-08-27 DIAGNOSIS — J30.9 ALLERGIC RHINITIS, UNSPECIFIED SEASONALITY, UNSPECIFIED TRIGGER: ICD-10-CM

## 2024-08-27 DIAGNOSIS — Z71.2 ENCOUNTER TO DISCUSS TEST RESULTS: ICD-10-CM

## 2024-08-27 DIAGNOSIS — E55.9 VITAMIN D DEFICIENCY: ICD-10-CM

## 2024-08-27 PROCEDURE — 3044F HG A1C LEVEL LT 7.0%: CPT | Mod: CPTII,S$GLB,, | Performed by: NURSE PRACTITIONER

## 2024-08-27 PROCEDURE — 3074F SYST BP LT 130 MM HG: CPT | Mod: CPTII,S$GLB,, | Performed by: NURSE PRACTITIONER

## 2024-08-27 PROCEDURE — 99213 OFFICE O/P EST LOW 20 MIN: CPT | Mod: S$GLB,,, | Performed by: NURSE PRACTITIONER

## 2024-08-27 PROCEDURE — 1159F MED LIST DOCD IN RCRD: CPT | Mod: CPTII,S$GLB,, | Performed by: NURSE PRACTITIONER

## 2024-08-27 PROCEDURE — 3078F DIAST BP <80 MM HG: CPT | Mod: CPTII,S$GLB,, | Performed by: NURSE PRACTITIONER

## 2024-09-12 DIAGNOSIS — J30.9 ALLERGIC RHINITIS, UNSPECIFIED SEASONALITY, UNSPECIFIED TRIGGER: ICD-10-CM

## 2024-09-12 RX ORDER — MONTELUKAST SODIUM 10 MG/1
10 TABLET ORAL NIGHTLY
Qty: 30 TABLET | Refills: 5 | Status: SHIPPED | OUTPATIENT
Start: 2024-09-12 | End: 2025-03-11

## 2024-10-21 ENCOUNTER — PATIENT MESSAGE (OUTPATIENT)
Dept: FAMILY MEDICINE | Facility: CLINIC | Age: 24
End: 2024-10-21
Payer: COMMERCIAL

## 2024-10-21 DIAGNOSIS — D50.9 IRON DEFICIENCY ANEMIA, UNSPECIFIED IRON DEFICIENCY ANEMIA TYPE: Primary | ICD-10-CM

## 2024-10-21 DIAGNOSIS — K90.9 INTESTINAL MALABSORPTION, UNSPECIFIED TYPE: ICD-10-CM

## 2024-10-21 RX ORDER — HEPARIN 100 UNIT/ML
500 SYRINGE INTRAVENOUS
OUTPATIENT
Start: 2024-10-21

## 2024-10-21 RX ORDER — SODIUM CHLORIDE 0.9 % (FLUSH) 0.9 %
10 SYRINGE (ML) INJECTION
OUTPATIENT
Start: 2024-10-21

## 2024-10-21 RX ORDER — DIPHENHYDRAMINE HYDROCHLORIDE 50 MG/ML
50 INJECTION INTRAMUSCULAR; INTRAVENOUS ONCE AS NEEDED
OUTPATIENT
Start: 2024-10-21

## 2024-10-21 RX ORDER — EPINEPHRINE 0.3 MG/.3ML
0.3 INJECTION SUBCUTANEOUS ONCE AS NEEDED
OUTPATIENT
Start: 2024-10-21

## 2024-10-21 NOTE — TELEPHONE ENCOUNTER
RBC 3.69  Hgb 11.3   Hct 34.2       Iron 19  Transferrin saturation 7.4      See discussion with patient regarding plan

## 2024-10-24 DIAGNOSIS — J30.9 ALLERGIC RHINITIS, UNSPECIFIED SEASONALITY, UNSPECIFIED TRIGGER: ICD-10-CM

## 2024-10-25 RX ORDER — MONTELUKAST SODIUM 10 MG/1
10 TABLET ORAL NIGHTLY
Qty: 30 TABLET | Refills: 5 | Status: SHIPPED | OUTPATIENT
Start: 2024-10-25 | End: 2025-04-23

## 2024-11-01 ENCOUNTER — PATIENT MESSAGE (OUTPATIENT)
Dept: FAMILY MEDICINE | Facility: CLINIC | Age: 24
End: 2024-11-01
Payer: COMMERCIAL

## 2024-11-01 DIAGNOSIS — D50.9 IRON DEFICIENCY ANEMIA, UNSPECIFIED IRON DEFICIENCY ANEMIA TYPE: Primary | ICD-10-CM

## 2024-11-01 NOTE — TELEPHONE ENCOUNTER
Patient has appointment set for 5th start of the first infusion. Also provided patient with phone number to call Dr. Franco's office to see if they can schedule patient appointment.

## 2024-11-20 ENCOUNTER — PATIENT MESSAGE (OUTPATIENT)
Dept: FAMILY MEDICINE | Facility: CLINIC | Age: 24
End: 2024-11-20
Payer: COMMERCIAL

## 2024-11-25 ENCOUNTER — PATIENT MESSAGE (OUTPATIENT)
Dept: PRIMARY CARE CLINIC | Facility: CLINIC | Age: 24
End: 2024-11-25
Payer: COMMERCIAL

## 2024-11-25 ENCOUNTER — PATIENT MESSAGE (OUTPATIENT)
Dept: OBSTETRICS AND GYNECOLOGY | Facility: CLINIC | Age: 24
End: 2024-11-25
Payer: COMMERCIAL

## 2024-11-25 DIAGNOSIS — Z76.89 ENCOUNTER TO ESTABLISH CARE: Primary | ICD-10-CM

## 2024-12-05 ENCOUNTER — OFFICE VISIT (OUTPATIENT)
Dept: OBSTETRICS AND GYNECOLOGY | Facility: CLINIC | Age: 24
End: 2024-12-05
Payer: COMMERCIAL

## 2024-12-05 VITALS
SYSTOLIC BLOOD PRESSURE: 131 MMHG | BODY MASS INDEX: 21.7 KG/M2 | WEIGHT: 130.38 LBS | DIASTOLIC BLOOD PRESSURE: 89 MMHG | HEART RATE: 106 BPM

## 2024-12-05 DIAGNOSIS — Z76.89 ENCOUNTER TO ESTABLISH CARE: ICD-10-CM

## 2024-12-05 DIAGNOSIS — Z01.419 WELL WOMAN EXAM WITH ROUTINE GYNECOLOGICAL EXAM: Primary | ICD-10-CM

## 2024-12-05 DIAGNOSIS — R22.32 AXILLARY LUMP, LEFT: ICD-10-CM

## 2024-12-05 PROCEDURE — 3008F BODY MASS INDEX DOCD: CPT | Mod: CPTII,,,

## 2024-12-05 PROCEDURE — 1159F MED LIST DOCD IN RCRD: CPT | Mod: CPTII,,,

## 2024-12-05 PROCEDURE — 3044F HG A1C LEVEL LT 7.0%: CPT | Mod: CPTII,,,

## 2024-12-05 PROCEDURE — 1160F RVW MEDS BY RX/DR IN RCRD: CPT | Mod: CPTII,,,

## 2024-12-05 PROCEDURE — 99385 PREV VISIT NEW AGE 18-39: CPT | Mod: S$PBB,,,

## 2024-12-05 PROCEDURE — 3079F DIAST BP 80-89 MM HG: CPT | Mod: CPTII,,,

## 2024-12-05 PROCEDURE — 3075F SYST BP GE 130 - 139MM HG: CPT | Mod: CPTII,,,

## 2024-12-05 NOTE — PROGRESS NOTES
Subjective:      Patient ID: Kwame Guerra is a 24 y.o. female who presents for evaluation today.    Chief Complaint:    Well Woman      History of Present Illness  Annual Exam (Premenopausal) - Patient presents for annual exam. The patient has no complaints today. The patient has never been sexually active. GYN screening history: no prior history of gyn screening tests. The patient wears seatbelts: yes. The patient participates in regular exercise: yes. Has the patient ever been transfused or tattooed?: not asked. The patient reports that there is not domestic violence in her life. She reports regular periods, lasting 4-5 days. She denies GI or  problems.    GYN History  Patient's last menstrual period was 12/05/2024.   Date of Last Pap: Pap smear completed today with HPV co-testing    VITALS  /89   Pulse 106   Wt 59.1 kg (130 lb 6.4 oz)   LMP 12/05/2024   BMI 21.70 kg/m²   Weight: 59.1 kg (130 lb 6.4 oz)         PAST MEDICAL HISTORY  Past Medical History:   Diagnosis Date    Acute ITP     at 8 years old    Anemia, unspecified        PAST SURGICAL HISTORY  Past Surgical History:   Procedure Laterality Date    CLOSED REDUCTION AND PERCUTANEOUS PINNING DISTAL RADIUS FRACTURE Left        SOCIAL HISTORY  Social History     Tobacco Use   Smoking Status Never   Smokeless Tobacco Never   ,   Social History     Substance and Sexual Activity   Alcohol Use Never        MEDICATIONS  Outpatient Medications Marked as Taking for the 12/5/24 encounter (Office Visit) with Génesis Beltran NP   Medication Sig Dispense Refill    montelukast (SINGULAIR) 10 mg tablet Take 1 tablet (10 mg total) by mouth every evening. 30 tablet 5         Review of Systems   Review of Systems   Constitutional:  Negative for activity change, chills and fever.   Eyes:  Negative for visual disturbance.   Respiratory:  Negative for shortness of breath.    Cardiovascular:  Negative for chest pain.   Gastrointestinal:  Negative for abdominal  pain, constipation, diarrhea, nausea and vomiting.   Genitourinary:  Negative for dysuria, flank pain, frequency, hematuria, menorrhagia, pelvic pain, urgency, vaginal bleeding and vaginal discharge.        No abnormal vaginal bleeding   Musculoskeletal:  Negative for back pain.   Integumentary:  Negative for rash and breast mass.   Neurological:  Negative for numbness and headaches.   Psychiatric/Behavioral:          No mood disturbance or changes    Breast: Negative for mass          Objective:     Physical Exam:   Constitutional: She is oriented to person, place, and time. She appears well-developed. She is cooperative.    HENT:   Head: Normocephalic.     Neck: Trachea normal. No thyromegaly present.    Cardiovascular:  Normal rate, regular rhythm and normal heart sounds.             Pulmonary/Chest: Effort normal and breath sounds normal. Right breast exhibits no mass, no nipple discharge and no skin change. Left breast exhibits no mass, no nipple discharge and no skin change.            Abdominal: Soft. There is no abdominal tenderness. There is no rebound and no guarding.     Genitourinary:    Vagina and uterus normal.      Pelvic exam was performed with patient supine.   The external female genitalia was normal.     Labial bartholins normal.There is no lesion on the right labia. There is no lesion on the left labia. Cervix is normal. Right adnexum displays no mass and no tenderness. Left adnexum displays no mass and no tenderness. Cervix exhibits no discharge.    pap smear completedUterus is not enlarged and not tender.              Lymphadenopathy:        Head (right side): No submental and no submandibular adenopathy present.        Head (left side): No submental and no submandibular adenopathy present.     She has no cervical adenopathy.    Neurological: She is alert and oriented to person, place, and time.    Skin: Skin is warm.    Psychiatric: She has a normal mood and affect. Her speech is normal and  behavior is normal. Thought content normal.          Assessment:     Well woman exam with routine gynecological exam  -     Liquid-based pap smear, screening    Encounter to establish care  -     Ambulatory referral/consult to Gynecology    Axillary lump, left  -     US Axilla Only (Breast Imaging) Left; Future; Expected date: 12/05/2024         Plan:     Patient instructed to contact the clinic should any questions or concerns arise prior to her next office visit. Patient is happy with the plan of care at this time, verbalizes understanding and denies outstanding questions.      Pap  Self-breast exams  Birth Control discussed  If you don't hear from the office regarding results within 1 week, please call  Follow up in 1 year for annual or sooner as needed  Chaperone present for exam   Female chaperone present.

## 2024-12-06 LAB — Lab: NORMAL

## 2025-01-09 DIAGNOSIS — J30.9 ALLERGIC RHINITIS, UNSPECIFIED SEASONALITY, UNSPECIFIED TRIGGER: ICD-10-CM

## 2025-01-09 RX ORDER — MONTELUKAST SODIUM 10 MG/1
10 TABLET ORAL NIGHTLY
Qty: 30 TABLET | Refills: 5 | Status: SHIPPED | OUTPATIENT
Start: 2025-01-09 | End: 2025-07-08

## 2025-03-05 DIAGNOSIS — J30.9 ALLERGIC RHINITIS, UNSPECIFIED SEASONALITY, UNSPECIFIED TRIGGER: ICD-10-CM

## 2025-03-06 RX ORDER — MONTELUKAST SODIUM 10 MG/1
10 TABLET ORAL NIGHTLY
Qty: 30 TABLET | Refills: 5 | Status: SHIPPED | OUTPATIENT
Start: 2025-03-06 | End: 2025-09-02

## 2025-04-17 DIAGNOSIS — J30.9 ALLERGIC RHINITIS, UNSPECIFIED SEASONALITY, UNSPECIFIED TRIGGER: ICD-10-CM

## 2025-04-21 RX ORDER — MONTELUKAST SODIUM 10 MG/1
10 TABLET ORAL NIGHTLY
Qty: 30 TABLET | Refills: 5 | Status: SHIPPED | OUTPATIENT
Start: 2025-04-21 | End: 2025-10-18